# Patient Record
Sex: FEMALE | Race: WHITE | Employment: PART TIME | URBAN - METROPOLITAN AREA
[De-identification: names, ages, dates, MRNs, and addresses within clinical notes are randomized per-mention and may not be internally consistent; named-entity substitution may affect disease eponyms.]

---

## 2021-12-15 ENCOUNTER — HOSPITAL ENCOUNTER (OUTPATIENT)
Dept: ULTRASOUND IMAGING | Age: 36
Discharge: HOME | End: 2021-12-17
Payer: MEDICARE

## 2021-12-15 ENCOUNTER — HOSPITAL ENCOUNTER (OUTPATIENT)
Age: 36
Discharge: HOME | End: 2021-12-15
Payer: MEDICARE

## 2021-12-15 DIAGNOSIS — R74.8 ELEVATED LIVER ENZYMES: ICD-10-CM

## 2021-12-15 LAB
CREATININE URINE: 113.6 MG/DL (ref 28–217)
HAV IGM SER IA-ACNC: NONREACTIVE
HEPATITIS B CORE IGM ANTIBODY: NONREACTIVE
HEPATITIS B SURFACE ANTIGEN: NONREACTIVE
HEPATITIS C ANTIBODY: NONREACTIVE
MICROALBUMIN/CREAT 24H UR: 19 MG/L
MICROALBUMIN/CREAT UR-RTO: 17 MCG/MG CREAT

## 2021-12-15 PROCEDURE — 80074 ACUTE HEPATITIS PANEL: CPT

## 2021-12-15 PROCEDURE — 82043 UR ALBUMIN QUANTITATIVE: CPT

## 2021-12-15 PROCEDURE — 76705 ECHO EXAM OF ABDOMEN: CPT

## 2021-12-15 PROCEDURE — 36415 COLL VENOUS BLD VENIPUNCTURE: CPT

## 2021-12-15 PROCEDURE — 82570 ASSAY OF URINE CREATININE: CPT

## 2022-01-27 ENCOUNTER — HOSPITAL ENCOUNTER (OUTPATIENT)
Dept: MAMMOGRAPHY | Age: 37
Discharge: HOME | End: 2022-01-29
Payer: MEDICARE

## 2022-01-27 ENCOUNTER — TELEPHONE (OUTPATIENT)
Dept: ONCOLOGY | Age: 37
End: 2022-01-27

## 2022-01-27 DIAGNOSIS — Z12.31 SCREENING MAMMOGRAM FOR BREAST CANCER: ICD-10-CM

## 2022-01-27 PROCEDURE — 77063 BREAST TOMOSYNTHESIS BI: CPT

## 2022-05-12 ENCOUNTER — OFFICE VISIT (OUTPATIENT)
Dept: PODIATRY | Age: 37
End: 2022-05-12
Payer: MEDICARE

## 2022-05-12 VITALS — HEIGHT: 65 IN | RESPIRATION RATE: 16 BRPM | BODY MASS INDEX: 40.48 KG/M2 | WEIGHT: 243 LBS

## 2022-05-12 DIAGNOSIS — M72.2 PLANTAR FASCIAL FIBROMATOSIS: Primary | ICD-10-CM

## 2022-05-12 DIAGNOSIS — M79.672 LEFT FOOT PAIN: ICD-10-CM

## 2022-05-12 PROCEDURE — 4004F PT TOBACCO SCREEN RCVD TLK: CPT | Performed by: PODIATRIST

## 2022-05-12 PROCEDURE — G8417 CALC BMI ABV UP PARAM F/U: HCPCS | Performed by: PODIATRIST

## 2022-05-12 PROCEDURE — G8427 DOCREV CUR MEDS BY ELIG CLIN: HCPCS | Performed by: PODIATRIST

## 2022-05-12 PROCEDURE — 99203 OFFICE O/P NEW LOW 30 MIN: CPT | Performed by: PODIATRIST

## 2022-05-12 RX ORDER — LISINOPRIL 5 MG/1
TABLET ORAL
COMMUNITY
Start: 2022-04-23

## 2022-05-12 RX ORDER — GLIPIZIDE 5 MG/1
TABLET ORAL
COMMUNITY
Start: 2022-05-11

## 2022-05-12 RX ORDER — ERGOCALCIFEROL 1.25 MG/1
CAPSULE ORAL
COMMUNITY
Start: 2022-05-07

## 2022-05-12 RX ORDER — ATORVASTATIN CALCIUM 10 MG/1
TABLET, FILM COATED ORAL
COMMUNITY
Start: 2022-05-11

## 2022-05-12 RX ORDER — AMLODIPINE BESYLATE 10 MG/1
TABLET ORAL
COMMUNITY
Start: 2022-03-07

## 2022-05-12 RX ORDER — BUSPIRONE HYDROCHLORIDE 7.5 MG/1
TABLET ORAL
COMMUNITY
Start: 2022-05-11

## 2022-05-12 RX ORDER — FLUOXETINE HYDROCHLORIDE 20 MG/1
CAPSULE ORAL
COMMUNITY
Start: 2022-05-11

## 2022-05-12 RX ORDER — TRAZODONE HYDROCHLORIDE 50 MG/1
TABLET ORAL
COMMUNITY
Start: 2022-05-11

## 2022-05-12 NOTE — PROGRESS NOTES
[address] [facility] PODIATRY [facility]  [address] [place] [address]  Dept: [phone]  Dept Fax: [phone]    NEW PATIENT PROGRESS NOTE  Date of patient's visit: 5/12/2022  Patient's Name:  Maxime Guaman YOB: 1985            Patient Care Team:  JESSE Thompson as PCP - General (Nurse Practitioner)  Crispin Snowden DPM as Consulting Physician (Podiatry)        Chief Complaint   Patient presents with    New Patient    Diabetes     diagnosed in Dec of 2021    Peripheral Neuropathy     bilaterally    Foot Pain     left heel pain x1 year         HPI:   Maxime Guaman is a 29 y.o. female who presents to the office today complaining of left heel pain. Symptoms began 1 year(s) ago. Patient relates pain is Present. Pain is rated 3 out of 10 and is described as constant, mild. Treatments prior to today's visit include: none today . Currently denies F/C/N/V. Pt's primary care physician is JESSE Thompson last seen 03/2022     Allergies   Allergen Reactions    Amoxicillin      unknown    Pcn [Penicillins]      unknown    Sulfa Antibiotics      unknown       No past medical history on file. Prior to Admission medications    Medication Sig Start Date End Date Taking?  Authorizing Provider   amLODIPine (NORVASC) 10 MG tablet TAKE 1 TABLET BY MOUTH DAILY 3/7/22  Yes Historical Provider, MD   atorvastatin (LIPITOR) 10 MG tablet  5/11/22  Yes Historical Provider, MD   busPIRone (BUSPAR) 7.5 MG tablet  5/11/22  Yes Historical Provider, MD   vitamin D (ERGOCALCIFEROL) 1.25 MG (71637 UT) CAPS capsule  5/7/22  Yes Historical Provider, MD   FLUoxetine (PROZAC) 20 MG capsule  5/11/22  Yes Historical Provider, MD   glipiZIDE (GLUCOTROL) 5 MG tablet  5/11/22  Yes Historical Provider, MD   lisinopril (PRINIVIL;ZESTRIL) 5 MG tablet TAKE 1 TABLET BY MOUTH DAILY 4/23/22  Yes Historical Provider, MD   metFORMIN (GLUCOPHAGE) 500 MG tablet  5/11/22  Yes Historical Provider, MD   traZODone (DESYREL) 50 MG tablet  5/11/22  Yes Historical Provider, MD   ondansetron (ZOFRAN ODT) 4 MG disintegrating tablet Take 1 tablet by mouth every 8 hours as needed for Nausea  Patient not taking: Reported on 5/12/2022 2/12/17   Lolita Lyons MD   ibuprofen (ADVIL;MOTRIN) 800 MG tablet Take 1 tablet by mouth every 8 hours as needed for Pain  Patient not taking: Reported on 5/12/2022 2/12/17   Lolita Lyons MD   tamsulosin (FLOMAX) 0.4 MG capsule Take 1 capsule by mouth daily for 5 doses 2/12/17 2/17/17  Lolita Lyons MD       No past surgical history on file. Family History   Problem Relation Age of Onset    Breast Cancer Paternal Aunt        Social History     Tobacco Use    Smoking status: Current Every Day Smoker     Packs/day: 0.50     Types: Cigarettes    Smokeless tobacco: Not on file   Substance Use Topics    Alcohol use: Not on file     Comment: occassionally       Review of Systems    Review of Systems:   History obtained from chart review and the patient  General ROS: negative for - chills, fatigue, fever, night sweats or weight gain  Constitutional: Negative for chills, diaphoresis, fatigue, fever and unexpected weight change. Musculoskeletal: Positive for arthralgias, gait problem and joint swelling. Neurological ROS: negative for - behavioral changes, confusion, headaches or seizures. Negative for weakness and numbness. Dermatological ROS: negative for - mole changes, rash  Cardiovascular: Negative for leg swelling. Gastrointestinal: Negative for constipation, diarrhea, nausea and vomiting. Lower Extremity Physical Examination:   Vitals:   Vitals:    05/12/22 0941   Resp: 16     General: AAO x 3 in NAD. Dermatologic Exam:  Skin lesion/ulceration Absent . Skin No rashes or nodules noted. .       Musculoskeletal:     1st MPJ ROM decreased, Bilateral.  Muscle strength 5/5, Bilateral. POP to the right and left great toenail medial and lateral border    POP of the left plantar medial calcaneal tuberosity. Pain increased with Dorsiflexion of the  left lesser toes. Negative pain on compression of the calcaneus bilaterally Medial longitudinal arch, Bilateral WNL. Ankle ROM WNL,Bilateral.    Dorsally contracted digits absent digits 1-5 Bilateral.     Vascular: DP and PT pulses palpable 2/4, Bilateral.  CFT <3 seconds, Bilateral.  Hair growth present to the level of the digits, Bilateral.  Edema absent, Bilateral.  Varicosities absent, Bilateral. Erythema absent, Bilateral    Neurological: Sensation intact to light touch to level of digits, Bilateral.  Protective sensation intact 10/10 sites via 5.07/10g [place]-Tsering Monofilament, Bilateral.  negative Tinel's, Bilateral.  negative Valleix sign, Bilateral.      Integument: Warm, dry, supple, Bilateral.  Open lesion absent, Bilateral.  Interdigital maceration absent to web spaces 1-4, Bilateral.  Nails are normal in length, thickness and color 1-5 bilateral.  Fissures absent, Bilateral.           Asessment: Patient is a 29 y.o. female with:    Diagnosis Orders   1. Plantar fascial fibromatosis  HM DIABETES FOOT EXAM   2. Left foot pain  HM DIABETES FOOT EXAM         Plan: Patient examined and evaluated. Current condition and treatment options discussed in detail. Discussed conservative and surgical options with the patient. Advised pt to her condition         Arch Pain: Exercises  Introduction  Here are some examples of exercises for you to try. The exercises may be suggested for a condition or for rehabilitation. Start each exercise slowly. Ease off the exercises if you start to have pain. You will be told when to start these exercises and which ones will work best for you. How to do the exercises  Plantar fascia stretch    1. Sit in a chair and put your affected foot on your other knee.   2. Hold the heel of your foot in one hand, and grasp your toes with the other hand. 3. Pull on your heel (toward your body), and at the same time pull your toes back with your other hand. 4. You should feel a stretch along the bottom of your foot. 5. Hold 15 to 30 seconds. 6. Repeat 2 to 4 times. Plantar fascia stretch (kneeling)    1. Get on your hands and knees on the floor. Keep your heels pointing up and the balls of your feet and your toes on the floor. 2. Slowly sit back toward your ankles. 3. If this is too hard, you can try doing it one leg at a time. Stand up, and then kneel on one knee and keep the other leg forward. Place the foot of your forward leg flat on the ground and bend that knee. The heel on the leg still behind you should point up. The ball and toes of that foot should be on the floor. Sit back toward that ankle. 4. Hold 15 to 30 seconds. 5. Repeat 2 to 4 times. Switch legs if you are doing this one leg at a time. Plantar fascia self-massage    1. Sit in a chair. 2. Place your affected foot on a firm, tube-shaped object, such as a can or water bottle. 3. Roll your foot back and forth over the object to massage the bottom of your foot. 4. If you want to do ice massage, fill a water bottle about three-fourths of the way full and freeze before using. 5. Continue for 2 to 5 minutes. Bilateral calf stretch (knees straight)    1. Place a book on the floor a few inches from a wall or countertop, and put the balls of your feet on it. Your heels should be on the floor. The book needs to be thick enough so that you can feel a gentle stretch in each calf. If you are not steady on your feet, hold on to a chair, counter, or wall while you do this stretch. 2. Keep your knees straight, and lean forward until you feel a stretch in each calf. 3. To get more stretch, add another book or use a thicker book, such as a phone book, a dictionary, or an encyclopedia. 4. Hold the stretch for at least 15 to 30 seconds. 5. Repeat 2 to 4 times.      Bilateral calf stretch (knees bent)    1. Place a book on the floor a few inches from a wall or countertop, and put the balls of your feet on it. Your heels should be on the floor. The book needs to be thick enough so that you can feel a gentle stretch in each calf. If you are not steady on your feet, hold on to a chair, counter, or wall while you do this stretch. 2. Bend your knees, and lean forward until you feel a stretch in each calf. 3. To get more stretch, add another book or use a thicker book, such as a phone book, a dictionary, or an encyclopedia. 4. Hold the stretch for at least 15 to 30 seconds. 5. Repeat 2 to 4 times. [place] pick-ups    1. Put some marbles on the floor next to a cup.  2. Sit down, and use the toes of your affected foot to lift up one marble from the floor at a time. Then try to put the marble in the cup.  3. Repeat 8 to 12 times. Towel scrunches    1. Sit down, and place your affected foot on a towel on the floor. You may also do this with both feet on the towel. 2. Scrunch the towel toward you with your toes. Then use your toes to push the towel back into place. 3. Repeat 8 to 12 times. Heel raises on a step    1. Stand on the bottom step of a staircase, facing up toward the stairs. Put the balls of your feet on the step. If you are not steady on your feet, hold on to the banister or wall. 2. Keeping both knees straight, slowly lift your heels above the step so that you are standing on your toes. Then slowly lower your heels below the step and toward the floor. 3. Return to the starting position, with your feet even with the step. 4. Repeat 8 to 12 times. Follow-up care is a key part of your treatment and safety. Be sure to make and go to all appointments, and call your doctor if you are having problems. It's also a good idea to know your test results and keep a list of the medicines you take. Where can you learn more? Go to https://ta.Gushcloud. org and sign in to your Sch[employer] account. Enter H119 in the [facility] box to learn more about \"Arch Pain: Exercises. \"     If you do not have an account, please click on the \"Sign Up Now\" link. Current as of: September 20, 2018  Content Version: 12.1  © 0042-4948 Healthwise, Incorporated. Care instructions adapted under license by [facility] ([facility]). If you have questions about a medical condition or this instruction, always ask your healthcare professional. [place] [address] any warranty or liability for your use of this information. For the toenail we may have to remove the ingrown curved nail . Verbal and written instructions given to patient. Contact office with any questions/problems/concerns. RTC in 4week(s).     5/12/2022    Electronically signed by Dai Martinez DPM on 5/12/2022 at 9:45 AM  5/12/2022

## 2022-06-07 ENCOUNTER — OFFICE VISIT (OUTPATIENT)
Dept: PODIATRY | Age: 37
End: 2022-06-07
Payer: MEDICARE

## 2022-06-07 VITALS — BODY MASS INDEX: 40.48 KG/M2 | HEIGHT: 65 IN | WEIGHT: 243 LBS

## 2022-06-07 DIAGNOSIS — M72.2 PLANTAR FASCIITIS, BILATERAL: Primary | ICD-10-CM

## 2022-06-07 DIAGNOSIS — M79.672 LEFT FOOT PAIN: ICD-10-CM

## 2022-06-07 DIAGNOSIS — M79.671 RIGHT FOOT PAIN: ICD-10-CM

## 2022-06-07 PROCEDURE — G8417 CALC BMI ABV UP PARAM F/U: HCPCS | Performed by: PODIATRIST

## 2022-06-07 PROCEDURE — 99213 OFFICE O/P EST LOW 20 MIN: CPT | Performed by: PODIATRIST

## 2022-06-07 PROCEDURE — 4004F PT TOBACCO SCREEN RCVD TLK: CPT | Performed by: PODIATRIST

## 2022-06-07 PROCEDURE — G8427 DOCREV CUR MEDS BY ELIG CLIN: HCPCS | Performed by: PODIATRIST

## 2022-06-07 NOTE — PROGRESS NOTES
[address] [facility] PODIATRY [facility]  [address] [place] [id number]  Dept: [phone]  Dept Fax: [phone]    RETURN PATIENT PROGRESS NOTE  Date of patient's visit: 6/7/2022  Patient's Name:  Nato Marquez YOB: 1985            Patient Care Team:  JESSE Kraus as PCP - General (Nurse Practitioner)  Tanya Butts DPM as Consulting Physician (Podiatry)       Susy Abrahamgisela Evans 29 y.o. female that presents for follow-up of   Chief Complaint   Patient presents with    Foot Pain     bilateral foot pain     Pt's primary care physician is JESSE Kraus last seen 03/2022  Symptoms began 1 year(s) ago and are decreased . Patient relates pain is Present. Pain is rated 1 out of 10 and is described as intermittent, mild. Treatments prior to today's visit include: preious ster. Currently denies F/C/N/V. Allergies   Allergen Reactions    Amoxicillin      unknown    Pcn [Penicillins]      unknown    Sulfa Antibiotics      unknown       No past medical history on file. Prior to Admission medications    Medication Sig Start Date End Date Taking?  Authorizing Provider   amLODIPine (NORVASC) 10 MG tablet TAKE 1 TABLET BY MOUTH DAILY 3/7/22  Yes Historical Provider, MD   atorvastatin (LIPITOR) 10 MG tablet  5/11/22  Yes Historical Provider, MD   busPIRone (BUSPAR) 7.5 MG tablet  5/11/22  Yes Historical Provider, MD   vitamin D (ERGOCALCIFEROL) 1.25 MG (08299 UT) CAPS capsule  5/7/22  Yes Historical Provider, MD   FLUoxetine (PROZAC) 20 MG capsule  5/11/22  Yes Historical Provider, MD   glipiZIDE (GLUCOTROL) 5 MG tablet  5/11/22  Yes Historical Provider, MD   lisinopril (PRINIVIL;ZESTRIL) 5 MG tablet TAKE 1 TABLET BY MOUTH DAILY 4/23/22  Yes Historical Provider, MD   metFORMIN (GLUCOPHAGE) 500 MG tablet  5/11/22  Yes Historical Provider, MD   traZODone (DESYREL) 50 MG tablet  5/11/22  Yes Historical Provider, MD ondansetron (ZOFRAN ODT) 4 MG disintegrating tablet Take 1 tablet by mouth every 8 hours as needed for Nausea  Patient not taking: Reported on 5/12/2022 2/12/17   Dafne Dixon MD   ibuprofen (ADVIL;MOTRIN) 800 MG tablet Take 1 tablet by mouth every 8 hours as needed for Pain  Patient not taking: Reported on 5/12/2022 2/12/17   Dafne Dixon MD   tamsulosin (FLOMAX) 0.4 MG capsule Take 1 capsule by mouth daily for 5 doses 2/12/17 2/17/17  Dafne Dixon MD       Review of Systems    Review of Systems:  History obtained from chart review and the patient  General ROS: negative for - chills, fatigue, fever, night sweats or weight gain  Constitutional: Negative for chills, diaphoresis, fatigue, fever and unexpected weight change. Musculoskeletal: Positive for arthralgias, gait problem and joint swelling. Neurological ROS: negative for - behavioral changes, confusion, headaches or seizures. Negative for weakness and numbness. Dermatological ROS: negative for - mole changes, rash  Cardiovascular: Negative for leg swelling. Gastrointestinal: Negative for constipation, diarrhea, nausea and vomiting. Lower Extremity Physical Examination:     Vitals: There were no vitals filed for this visit. General: AAO x 3 in NAD. Dermatologic Exam:  Skin lesion/ulceration Absent . Skin No rashes or nodules noted. .       Musculoskeletal:     1st MPJ ROM decreased, Bilateral.  Muscle strength 5/5, Bilateral. POP of the right and left plantar medial calcaneal tuberosity. Pain increased with Dorsiflexion of the right and left lesser toes. Negative pain on compression of the calcaneus bilaterally Medial longitudinal arch, Bilateral WNL.   Ankle ROM WNL,Bilateral.    Dorsally contracted digits absent digits 1-5 Bilateral.     Vascular: DP and PT pulses palpable 2/4, Bilateral.  CFT <3 seconds, Bilateral.  Hair growth present to the level of the digits, Bilateral.  Edema absent, Bilateral.  Varicosities absent, Bilateral. Erythema absent, Bilateral    Neurological: Sensation intact to light touch to level of digits, Bilateral.  Protective sensation intact 10/10 sites via 5.07/10g [place]-Tsering Monofilament, Bilateral.  negative Tinel's, Bilateral.  negative Valleix sign, Bilateral.      Integument: Warm, dry, supple, Bilateral.  Open lesion absent, Bilateral.  Interdigital maceration absent to web spaces 1-4, Bilateral.  Nails are normal in length, thickness and color 1-5 bilateral.  Fissures absent, Bilateral.           Asessment: Patient is a 29 y.o. female with:    Diagnosis Orders   1. Plantar fasciitis, bilateral     2. Left foot pain     3. Right foot pain         Plan: Patient examined and evaluated. Current condition and treatment options discussed in detail. Advised pt to contiue to stretech her achilles tendon and use shoes at all time with arch supports. Heel Spurs/Plantar Fasciitis      1. Taping- keep on for 5-7 days if possible and DO NOT get it wet. If you see redness or feel itching, then take off the taping: this may be an indication that you are allergic to the tap. 2. Injection- contains a small amount of cortisone to reduce your inflammation. It is possible that after 24 hours you may feel a small increase in pain at the injection site. This is \"Steroid Flare\" and will subside after 24 hours. 3. Anti-inflammatory medications- Take as prescribed      4. Ice massage- There are a number of ways to ice your foot/feet. The best way is to place a full water bottle in the freezer and then roll the bottom of your foot on it up to 3 times a day. 5. Heel cord stretching- stretch at least twice a day.  See below    Wall Stretch        Affected Heel flat on floor and keep that knee straight  Bend the other knee and lean into the wall  Hold that stretched position for 20-30 seconds  Repeat 5 times    DO NOT: Bounce or jerk back and forth while doing the stretch  DO Not: Point toes out to the side                  Towel Stretch      Place towel under ball of foot  Pull foot towards you and hold for 5 seconds  Then push foot down and hold for 5 seconds  Repeat this 5-10 times      6. Wear good supportive shoes with adequate shock absorption. 7. Orthotics: We recommend custom molded orthotics and we can call your insurance to see if this is a covered benefit for you. However, if they are not covered, we carry over-the- counter orthotics called Powersteps. They cost $43.00 and can be purchased with cash, check or charge at the . 8. Physical Therapy- will be prescribed as needed. .  Verbal and written instructions given to patient. Contact office with any questions/problems/concerns. No orders of the defined types were placed in this encounter. No orders of the defined types were placed in this encounter.        RTC in PRN.    6/7/2022      Electronically signed by Nicol Pinto DPM on 6/7/2022 at 8:54 AM  6/7/2022

## 2022-08-16 ENCOUNTER — HOSPITAL ENCOUNTER (OUTPATIENT)
Facility: MEDICAL CENTER | Age: 37
End: 2022-08-16

## 2022-08-18 ENCOUNTER — INITIAL CONSULT (OUTPATIENT)
Dept: ONCOLOGY | Age: 37
End: 2022-08-18
Payer: MEDICARE

## 2022-08-18 DIAGNOSIS — Z80.0 FAMILY HISTORY OF COLON CANCER: ICD-10-CM

## 2022-08-18 DIAGNOSIS — Z80.3 FAMILY HISTORY OF BREAST CANCER: Primary | ICD-10-CM

## 2022-08-18 PROCEDURE — 96040 PR GENETIC COUNSELING, EACH 30 MIN: CPT | Performed by: GENETIC COUNSELOR, MS

## 2022-08-18 NOTE — PROGRESS NOTES
[employer] [facility]   Hereditary Cancer Risk Assessment     Name: Radha Amos   YOB: 1985   Date of Consultation: 8/18/22     Ms. Efrain Huff was seen at the [address] [place] [address] for genetic counseling on 8/18/22. Ms. Efrain Huff was referred by Clarisse Meigs, APRN to discuss her risk factors for breast cancer. PERSONAL HISTORY   Ms. Efrain Huff is a 40 y.o.  female with no personal history of cancer. She reports:     Menarche at age: 14y  First child at age: 35y  Menopause at age: NA  Hysterectomy: NA  Oophorectomy: NA  Last mammogram: 1/27/2022, normal - TC=21.6%  Last breast MRI: NA  Breast biopsy: NA  Colonoscopy: NA    At her mammogram on 1/27/22, a lifetime risk for breast cancer was calculated. According to the [employer] risk model, Ms. Johnss lifetime risk for breast cancer is approximately 21.6%. FAMILY HISTORY  Ms. Efrain Huff has one son (4y) and one daughter (2y). She reports that her father was diagnosed with colon cancer at age 64. Her father had just one sister who was diagnosed with breast cancer at age 39. There are no other known cancers in her extended paternal family. Ms. Efrain Huff reports that her mother has not had cancer. Two maternal aunts had skin cancer and one with thyroid cancer. Her maternal grandmother had lung cancer. Ms. Efrain Huff reports [place], [place], [place] ancestry and denies any known Ashkenazi Moravian heritage. RISK ASSESSMENT   We discussed that approximately 5-10% of cancers are due to a hereditary gene mutation which causes an increased risk for certain cancers. Hereditary cancers are typically diagnosed at younger ages (under age 46y) and occur in multiple generations of a family. Multiple individuals with the same type of cancer (example: breast or colorectal) or uncommon cancers (example: ovarian, pancreatic, male breast cancer) are also features of hereditary cancers.      In summary, Ms. Evans meets the [address] [place] [address] (NCCN) guidelines for genetic testing based on having a paternal second degree relative with breast cancer diagnosed at age 39. This relative is unavailable for genetic testing; thus, Ms. Belén Moore is an appropriate candidate for genetic testing. The NCCN guidelines also recognize that an individual's personal and/or family history may be explained by more than one inherited cancer syndrome. Thus, a multi-gene panel may be more efficient, more cost effecting, and increases the yield of detecting a hereditary mutation which would impact medical management. Given her personal and/or family history, we recommend testing for the following genes at minimum: MINI, CHEK2, NBN, and PALB2. DISCUSSION  We discussed that the BRCA1/2 genes are the most common genes associated with hereditary breast and ovarian cancer. We also discussed that genetic testing is available for multiple other genes related to hereditary cancer. Some of these genes are known to carry a significant increased risk for several cancers including colon, breast, uterine, ovarian, stomach, and pancreatic cancer, while some of these genes are believed to have a moderate increased risk for breast and other cancers. We discussed the possibility of finding a mutation in genes with limited information to guide medical management, as well we as the possibility of identifying variants of uncertain significance (VUS). We discussed the risks, benefits, and limitations of genetic testing. Possible test results were discussed as well as potential screening and prevention strategies. Specifically, we discussed increased breast cancer surveillance by mammogram and breast MRI as well as the option of prophylactic mastectomy. We discussed the recommendation for prophylactic oophorectomy for results which suggest an increased risk for ovarian cancer.  Lastly, we discussed that the results of Ms. Cristina's genetic testing may be beneficial in defining her risk for cancer as well as for her family members. SUMMARY & PLAN  1) Ms. Evans meets the NCCN criteria for genetic testing based on her paternal family history of cancer, including a second degree relative with breast cancer at age 39.      2) Genetic testing via a multi-gene panel was recommended and offered to Ms. Evans. 3) Ms. Evans elected to proceed with the [facility] [employer] [facility] [facility] Hereditary Cancer Gene Panel. 4) Ms. Juan Carlos Hernández is aware that she will receive a notification from the laboratory [facility]) if the out of pocket cost for testing exceeds $100 (based on individual insurance plan) and the option to proceed with the self pay price of $249.     5) Informed consent was obtained and a blood sample was sent to [facility]. We will call Ms. Evans with results as soon as they are available. A follow up appointment may be recommended. A summary letter with results and final medical management recommendations will be sent once available. 6) If her genetic test results are negative, Ms. Evans's remaining lifetime risk for breast cancer is approximately 21.6%. She may still consider annual breast MRI screening. She plans to follow up with her PCP about this in the next few weeks. She is deferring her consultation with medical oncology until after her genetic test results are available. A total of 35 minutes were spent face to face with Ms. Evans and 50% of the time was spent educating and counseling. The [address] [place] [address] [employer] [facility] would be glad to offer our assistance should you have any questions or concerns about this information. Please feel free to contact us at [phone]. soila Mallika.  Luisa De La Garza MS, [facility]   Licensed Genetic Counselor

## 2022-09-06 ENCOUNTER — TELEPHONE (OUTPATIENT)
Dept: ONCOLOGY | Age: 37
End: 2022-09-06

## 2022-09-06 NOTE — TELEPHONE ENCOUNTER
3 [facility]   Hereditary Cancer Risk Assessment     Name: Mary Sexton  YOB: 1985  Date of Results Disclosure: 09/06/22      HISTORY   Ms. Barrett Osborn was seen for genetic counseling at the request of JESSE Lowe due to her family history of cancer. At that time, Ms. Evans chose to pursue genetic testing via the CancerNext Expanded + RNA gene panel. These results were discussed with Ms. Evans via telephone. A summary of Ms. Evans's results and recommendations are below. RESULTS  [facility] CancerNext-Expanded Panel + RNAinsight: NEGATIVE - NO CLINICALLY SIGNIFICANT MUTATIONS DETECTED   This panel included the analysis of 77 genes associated with hereditary cancer including: AIP, ALK, APC, MINI, BAP1, BARD1, BLM, BMPR1A, BRCA1, BRCA2, BRIP1, CDC73, CDH1, CDK4, CDKN1B, CDKN2A, CHEK2, CTNNA1, DICER1, EGFR, EGLN1, EPCAM, FANCC, FH, FLCN, GALNT12, GREM1, HOXB13, KIF1B, KIT1, LZTR1, MAX, MEN1, MET, MITF, MLH1, MSH2, MSH3, MSH6, MUTYH, NBN, NF1, NF2, NTHL1, PALB2, PDGFRA, PHOX2B, PMS2, POLD1, POLE, POT1, ZACPV7O, PTCH1, PTEN, RAD51C, RAD51D, RB1, RECQL, RET, SDHA, SDHAF2, SDHB, SDHC, ,SDHD, SMAD4, SMARCA4, SMARCB1, SMARCE1, STK11, SUFU, GOZY183, TP53, TSC1, TSC2, VHL, and XRCC2. In addition, no clinically relevant aberrant RNA transcripts were detected in select genes. Please refer to genetic test report for technical details. We discussed that Ms. Evans's negative test result greatly reduces the likelihood that she carries a hereditary gene mutation. However, it is possible that her family history of cancer is due to a hereditary mutation which she did not inherit. It is also possible that her family history of cancer may be due to a gene for which testing was not performed or which has yet to be discovered. RECOMMENDATIONS  1) While Ms. Barrett Osborn does not carry a known hereditary gene mutation, her risk for breast cancer may still be elevated due to her remaining family history of breast cancer. Based on Ms. Evans's personal risk factors and family history of breast cancer, her estimated lifetime risk for breast cancer is 21.635% according to the [employer] risk model. The  [facility] recommends that women with a lifetime risk of breast cancer 20% or higher consider the following screening and risk reducing options:     NCCN Recommendation Age to Begin Frequency    Breast awareness - Women should be familiar with their breasts and promptly report changes to their healthcare provider. Periodic, consistent breast self-examination (BSE) may be beneficial  Individualized  N/A    Clinical Breast Examination  Individualized Every 6-12 months   Breast MRI with contrast  28years old  Annual   Mammogram (consider tomosynthesis)  29years old  Annual    Consider risk reducing agents (i.e. Tamoxifen)  Individualized  N/A    *age to begin screening is based on the onset of breast cancer in Ms. Evans's family    2) While Ms. Wan Ward does not carry a known hereditary gene mutation, her risk for colorectal cancer may still be elevated due to her remaining family history. The [address] [place] [address] (NCCN) recommends that individuals with a first degree relative with colorectal cancer at any age, consider colonoscopy screening every 5 years beginning at age 36. We recommend that Ms. Evans continue colonoscopy screening as directed by her physicians. 3) Ms. Wan Ward should continue general population cancer screening guidelines as directed by her physicians. RECOMMENDATIONS FOR FAMILY MEMBERS   1) Genetic testing is not recommended for Ms. Evans's children based on her negative test results. However, this recommendation does not take into consideration any family history of cancer in their paternal family. 2) It is possible that the cancers in Ms. Johnss family are due to a hereditary gene mutation that she did not inherit. Therefore, her relatives (particularly those with a current or previous cancer diagnosis) may consider genetic counseling and testing to clarify this possibility. Relatives may contact the [employer] at [phone] or locate a genetic counselor at [email or web address].     3) We encourage Ms. Evans's relatives to discuss their family history of cancer with their physicians to determine the most appropriate cancer screening recommendations. Ms. Hernandez Cazares female relatives may benefit from a formal breast cancer risk assessment by the Maame Grange or Jody Gouge risk models to determine if additional breast cancer screening is warranted. SUMMARY & PLAN   1) Ms. Johnss genetic test results are negative meaning there were no clinically significant mutations detected in the 77 genes analyzed. 2) Ms. Johnss lifetime risk for breast cancer is 21.6% according to the Jody Gouge risk model. She may consider the NCCN guidelines outlined above for breast cancer surveillance. This includes annual breast MRI screening staggered 6 months apart from annual mammograms. She should also consider colonoscopy screening at least every 5 years beginning by age 36 due to having a first degree relative with colon cancer. She plans to discuss these recommendations with her referring provider next week. 3) There are no other changes in medical management for Ms. Evans based on her negative genetic test results. 4) We encourage Ms. Evans to contact us every 1-2 years to determine if there are any new genetic testing or research options available. 5) We encourage Ms. Evans to contact us with updates to her personal and/or familys cancer history as this information may alter our assessment and/or recommendations.      The [employer] would be glad to offer our assistance should you have any questions or concerns about this information. Please feel free to contact us at [phone]. Ronald Caldera.  MS Florentin, [facility]   Licensed Genetic Counselor         CC:  JESSE Diallo

## 2022-09-06 NOTE — TELEPHONE ENCOUNTER
Left message for Sugey Hall notifying her that her genetic test results are available. Requested that she return my phone call at her earliest convenience to review results.

## 2022-12-14 ENCOUNTER — HOSPITAL ENCOUNTER (EMERGENCY)
Age: 37
Discharge: HOME | End: 2022-12-14
Attending: EMERGENCY MEDICINE
Payer: MEDICARE

## 2022-12-14 ENCOUNTER — APPOINTMENT (OUTPATIENT)
Dept: GENERAL RADIOLOGY | Age: 37
End: 2022-12-14
Payer: MEDICARE

## 2022-12-14 VITALS
DIASTOLIC BLOOD PRESSURE: 94 MMHG | OXYGEN SATURATION: 99 % | TEMPERATURE: 98.2 F | HEART RATE: 88 BPM | SYSTOLIC BLOOD PRESSURE: 162 MMHG | RESPIRATION RATE: 18 BRPM

## 2022-12-14 DIAGNOSIS — S93.601A SPRAIN OF RIGHT FOOT, INITIAL ENCOUNTER: Primary | ICD-10-CM

## 2022-12-14 PROCEDURE — 73610 X-RAY EXAM OF ANKLE: CPT

## 2022-12-14 PROCEDURE — 73630 X-RAY EXAM OF FOOT: CPT

## 2022-12-14 PROCEDURE — 99283 EMERGENCY DEPT VISIT LOW MDM: CPT

## 2022-12-14 RX ORDER — IBUPROFEN 800 MG/1
800 TABLET ORAL EVERY 8 HOURS PRN
Qty: 20 TABLET | Refills: 0 | Status: SHIPPED | OUTPATIENT
Start: 2022-12-14

## 2022-12-14 ASSESSMENT — PAIN DESCRIPTION - LOCATION: LOCATION: ANKLE;FOOT

## 2022-12-14 ASSESSMENT — ENCOUNTER SYMPTOMS
CONSTIPATION: 0
VOMITING: 0
ABDOMINAL PAIN: 0
EYE REDNESS: 0
COLOR CHANGE: 0
SHORTNESS OF BREATH: 0
DIARRHEA: 0
EYE DISCHARGE: 0
FACIAL SWELLING: 0
COUGH: 0

## 2022-12-14 ASSESSMENT — PAIN DESCRIPTION - FREQUENCY: FREQUENCY: CONTINUOUS

## 2022-12-14 ASSESSMENT — PAIN - FUNCTIONAL ASSESSMENT: PAIN_FUNCTIONAL_ASSESSMENT: 0-10

## 2022-12-14 ASSESSMENT — PAIN DESCRIPTION - ORIENTATION: ORIENTATION: RIGHT

## 2022-12-14 ASSESSMENT — PAIN SCALES - GENERAL: PAINLEVEL_OUTOF10: 8

## 2022-12-14 ASSESSMENT — PAIN DESCRIPTION - DESCRIPTORS: DESCRIPTORS: DISCOMFORT

## 2022-12-14 ASSESSMENT — PAIN DESCRIPTION - PAIN TYPE: TYPE: ACUTE PAIN

## 2022-12-15 NOTE — ED PROVIDER NOTES
[address] [place] [address] ED  EMERGENCY DEPARTMENT ENCOUNTER      Pt Name: Jada Puri  MRN: [id number]  [place] 1985  Date of evaluation: 12/14/2022  Provider: Jada Puri MD    [address] [place] [address]       Chief Complaint   Patient presents with    Ankle Injury     Right     Foot Injury     Right     Fall     1500 today/ no loc/ did not hit head         HISTORY OF PRESENT ILLNESS  (Location/Symptom, Timing/Onset, Context/Setting, Quality, Duration, Modifying Factors, Severity.)   Jada Puri is a 40 y.o. female who presents to the emergency department for pain to her right foot and ankle. She stepped in a mole tunnel in the mid afternoon today and she rolled her ankle. She points to the lateral malleolus and the distal lateral foot area to indicate area of pain. No other injury was sustained. She had a brace at home that she used and she also had crutches that she used to get here. She is never injured that foot or ankle before. The pain is moderate and worse if she steps on it. Nursing Notes were reviewed. ALLERGIES     Amoxicillin, Pcn [penicillins], and Sulfa antibiotics    CURRENT MEDICATIONS       Previous Medications    AMLODIPINE (NORVASC) 10 MG TABLET    TAKE 1 TABLET BY MOUTH DAILY    ATORVASTATIN (LIPITOR) 10 MG TABLET        BUSPIRONE (BUSPAR) 7.5 MG TABLET        FLUOXETINE (PROZAC) 20 MG CAPSULE        GLIPIZIDE (GLUCOTROL) 5 MG TABLET        LISINOPRIL (PRINIVIL;ZESTRIL) 5 MG TABLET    TAKE 1 TABLET BY MOUTH DAILY    METFORMIN (GLUCOPHAGE) 500 MG TABLET        ONDANSETRON (ZOFRAN ODT) 4 MG DISINTEGRATING TABLET    Take 1 tablet by mouth every 8 hours as needed for Nausea    TAMSULOSIN (FLOMAX) 0.4 MG CAPSULE    Take 1 capsule by mouth daily for 5 doses    TRAZODONE (DESYREL) 50 MG TABLET        VITAMIN D (ERGOCALCIFEROL) 1.25 MG (46385 UT) CAPS CAPSULE           PAST MEDICAL HISTORY   History reviewed. No pertinent past medical history. SURGICAL HISTORY     History reviewed.  No pertinent surgical history. FAMILY HISTORY           Problem Relation Age of Onset    Colon Cancer Father         57y, in remission    Cancer Maternal Grandfather         lung cancer    Breast Cancer Paternal Aunt         54y, stage IIIC, ER+    Cancer Maternal Aunt         skin cancer 35s, thyroid cancer    Cancer Maternal Aunt         skin cancer 46s     Family Status   Relation Name Status    Father 62y Alive    Sister full (Not Specified)    Brother full (Not Specified)    MGF  (Not Specified)    PAunt 62s Alive        age 50    [facility] x[facility]    Son 4y Alive    Roshan 2y [address]      reports that she has been smoking cigarettes. She has been smoking an average of .5 packs per day. She does not have any smokeless tobacco history on file. She reports that she does not use drugs. REVIEW OF SYSTEMS    (2-9 systems for level 4, 10 or more for level 5)     Review of Systems   Constitutional:  Negative for chills, fatigue and fever. HENT:  Negative for congestion, ear discharge and facial swelling. Eyes:  Negative for discharge and redness. Respiratory:  Negative for cough and shortness of breath. Cardiovascular:  Negative for chest pain. Gastrointestinal:  Negative for abdominal pain, constipation, diarrhea and vomiting. Genitourinary:  Negative for dysuria and hematuria. Musculoskeletal:  Negative for arthralgias. Skin:  Negative for color change and rash. Neurological:  Negative for syncope, numbness and headaches. Hematological:  Negative for adenopathy. Psychiatric/Behavioral:  Negative for confusion. The patient is not nervous/anxious. Except as noted above the remainder of the review of systems was reviewed and negative.      PHYSICAL EXAM    (up to 7 for level 4, 8 or more for level 5)     Vitals:    12/14/22 2007   BP: (!) 162/94   Pulse: 88   Resp: 18   Temp: 98.2 °F (36.8 °C)   TempSrc: Oral   SpO2: 99% Physical Exam  Vitals reviewed. Constitutional:       General: She is not in acute distress. Appearance: She is well-developed. She is not diaphoretic. HENT:      Head: Normocephalic and atraumatic. Eyes:      General: No scleral icterus. Right eye: No discharge. Left eye: No discharge. Cardiovascular:      Rate and Rhythm: Normal rate and regular rhythm. Pulmonary:      Effort: Pulmonary effort is normal. No respiratory distress. Breath sounds: Normal breath sounds. No stridor. No wheezing or rales. Abdominal:      General: There is no distension. Palpations: Abdomen is soft. Tenderness: There is no abdominal tenderness. Musculoskeletal:      Cervical back: Neck supple. Comments: She has some mild swelling of the proximal right lateral foot area extending up to the lateral malleolus. Skin intact. Right knee nontender. Lymphadenopathy:      Cervical: No cervical adenopathy. Skin:     General: Skin is warm and dry. Findings: No erythema or rash. Neurological:      Mental Status: She is alert and oriented to person, place, and time.    Psychiatric:         Behavior: Behavior normal.           DIAGNOSTIC RESULTS     EKG: All EKG's are interpreted by the Emergency Department Physician who either signs or Co-signs this chart in the absence of a cardiologist.    Not indicated    RADIOLOGY:   Non-plain film images such as CT, Ultrasound and MRI are read by the radiologist. Linzie Clear radiographic images are visualized and preliminarily interpreted by the emergency physician with the below findings:    Interpretation per the Radiologist below, if available at the time of this note:    XR ANKLE RIGHT (MIN 3 VIEWS)    Result Date: 12/14/2022  EXAM: XR Right Ankle Complete, 3 or More Views EXAM DATE/TIME: 12/14/2022 8:33 pm CLINICAL HISTORY: ORDERING SYSTEM PROVIDED  injury  TECHNOLOGIST PROVIDED HISTORY:  injury Reason for Exam: Injury, twisted ankle in mole hole today TECHNIQUE: Frontal, lateral and oblique views of the right ankle. COMPARISON: No relevant prior studies available. FINDINGS: Bones/joints:  No acute findings. No acute fracture. No dislocation. Soft tissues:  Medial ankle soft tissue swelling. Medial ankle soft tissue swelling. No evidence of fracture or malalignment. XR FOOT RIGHT (MIN 3 VIEWS)    Result Date: 12/14/2022  EXAM: XR Right Foot Complete, 3 or More Views EXAM DATE/TIME: 12/14/2022 8:33 pm CLINICAL HISTORY: ORDERING SYSTEM PROVIDED  injury  TECHNOLOGIST PROVIDED HISTORY:  injury Reason for Exam: Injury, twisted ankle in mole hole today TECHNIQUE: Frontal, lateral and oblique views of the right foot. COMPARISON: No relevant prior studies available. FINDINGS: Bones/joints:  No acute findings. No acute fracture. No dislocation. Soft tissues:  No acute findings. No radiopaque foreign body. No acute findings in the right foot. LABS:  Labs Reviewed - No data to display    All other labs were within normal range or not returned as of this dictation. EMERGENCY DEPARTMENT COURSE and DIFFERENTIAL DIAGNOSIS/MDM:   Vitals:    Vitals:    12/14/22 2007   BP: (!) 162/94   Pulse: 88   Resp: 18   Temp: 98.2 °F (36.8 °C)   TempSrc: Oral   SpO2: 99%       Orders Placed This Encounter   Medications    ibuprofen (ADVIL;MOTRIN) 800 MG tablet     Sig: Take 1 tablet by mouth every 8 hours as needed for Pain     Dispense:  20 tablet     Refill:  0       Medical Decision Making: Differential diagnosis includes fracture and sprain. There is no evidence of a fracture on the x-rays. The patient has a friend who has a boot that she can use and the patient has her own crutches as well. Treatment diagnosis and follow-up were discussed with the patient      CONSULTS:  None    PROCEDURES:  None    FINAL IMPRESSION      1.  Sprain of right foot, initial encounter          DISPOSITION/PLAN   DISPOSITION Decision To Discharge 12/14/2022 09:00:20 PM      PATIENT REFERRED TO:   JESSE Colvin  [place] [id number]  [phone]      As needed    [facility] ED  [address] [facility]  [phone]    If symptoms worsen    DISCHARGE MEDICATIONS:     New Prescriptions    IBUPROFEN (ADVIL;MOTRIN) 800 MG TABLET    Take 1 tablet by mouth every 8 hours as needed for Pain       The care is provided during an unprecedented national emergency due to the novel coronavirus, COVID-19.     (Please note that portions of this note were completed with a voice recognition program.  Efforts were made to edit the dictations but occasionally words are mis-transcribed.)    Samuel Newman MD  Attending Emergency Physician           Samuel Newman MD  12/14/22 [id number]